# Patient Record
Sex: MALE | Race: WHITE | NOT HISPANIC OR LATINO | URBAN - METROPOLITAN AREA
[De-identification: names, ages, dates, MRNs, and addresses within clinical notes are randomized per-mention and may not be internally consistent; named-entity substitution may affect disease eponyms.]

---

## 2024-07-06 ENCOUNTER — OFFICE VISIT (OUTPATIENT)
Dept: URGENT CARE | Facility: MEDICAL CENTER | Age: 39
End: 2024-07-06
Payer: COMMERCIAL

## 2024-07-06 VITALS
OXYGEN SATURATION: 98 % | TEMPERATURE: 98 F | SYSTOLIC BLOOD PRESSURE: 120 MMHG | RESPIRATION RATE: 18 BRPM | DIASTOLIC BLOOD PRESSURE: 80 MMHG | HEART RATE: 80 BPM

## 2024-07-06 DIAGNOSIS — S90.465A INSECT BITE OF LESSER TOE OF LEFT FOOT, INITIAL ENCOUNTER: Primary | ICD-10-CM
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
## 2024-07-06 PROCEDURE — S9083 URGENT CARE CENTER GLOBAL: HCPCS | Performed by: NURSE PRACTITIONER

## 2024-07-06 PROCEDURE — G0383 LEV 4 HOSP TYPE B ED VISIT: HCPCS | Performed by: NURSE PRACTITIONER

## 2024-07-06 RX ORDER — DEXTROAMPHETAMINE SACCHARATE, AMPHETAMINE ASPARTATE, DEXTROAMPHETAMINE SULFATE AND AMPHETAMINE SULFATE 5; 5; 5; 5 MG/1; MG/1; MG/1; MG/1
TABLET ORAL
COMMUNITY
Start: 2024-05-24

## 2024-07-06 NOTE — PROGRESS NOTES
Assessment/Plan    Insect bite of lesser toe of left foot, initial encounter [S90.465A, W57.XXXA]  1. Insect bite of lesser toe of left foot, initial encounter          Apply OTC steroid cream BID , alternate with OTC benadryl cream BID   PRN PO NSAIDs for pain   Proceed to ER should symptoms worsen     Patient ID: Felipe Gilliam is a 39 y.o. male.      Reason For Visit / Chief Complaint  Chief Complaint   Patient presents with    Insect Bite     Patient states he woke up with insect bite to left foot/second toe; redness, itchy, swelling          38 y/o male presents with swelling to the left 2nd toe for 2 days. He is unsure if he got stung or bit by something but feels that this is likely insect related. He has not tried anything to help the swelling or pain. Denies fevers, chills, trauma or injury. He does express some difficulty walking due to discomfort. Currently he would rate the pain a 4/10. He also states the area has become more red and swollen over the past few days. Denies any hx of gout.     Insect Bite  This is a new problem. The current episode started in the past 7 days. The problem occurs constantly. The problem has been waxing and waning. Associated symptoms include joint swelling and a rash. Pertinent negatives include no abdominal pain, arthralgias, chest pain, chills, coughing, diaphoresis, fatigue, fever or nausea. The symptoms are aggravated by standing and walking. He has tried nothing for the symptoms. The treatment provided no relief.       History reviewed. No pertinent past medical history.    History reviewed. No pertinent surgical history.    History reviewed. No pertinent family history.    Review of Systems   Constitutional:  Negative for chills, diaphoresis, fatigue and fever.   HENT: Negative.     Eyes: Negative.    Respiratory: Negative.  Negative for cough.    Cardiovascular: Negative.  Negative for chest pain.   Gastrointestinal: Negative.  Negative for abdominal pain and  nausea.   Endocrine: Negative.    Genitourinary: Negative.    Musculoskeletal:  Positive for joint swelling. Negative for arthralgias.   Skin:  Positive for rash.   Allergic/Immunologic: Negative.    Neurological: Negative.    Hematological: Negative.    Psychiatric/Behavioral: Negative.         Objective:    /80   Pulse 80   Temp 98 °F (36.7 °C) (Temporal)   Resp 18   SpO2 98%     Physical Exam  Constitutional:       Appearance: Normal appearance. He is not toxic-appearing.   HENT:      Head: Normocephalic and atraumatic.      Right Ear: External ear normal.      Left Ear: External ear normal.      Nose: Nose normal.      Mouth/Throat:      Mouth: Mucous membranes are moist.      Pharynx: Oropharynx is clear.   Eyes:      Conjunctiva/sclera: Conjunctivae normal.   Cardiovascular:      Rate and Rhythm: Normal rate and regular rhythm.      Pulses: Normal pulses.      Heart sounds: Normal heart sounds.   Pulmonary:      Effort: Pulmonary effort is normal.      Breath sounds: Normal breath sounds.   Abdominal:      General: Abdomen is flat.      Palpations: Abdomen is soft.   Musculoskeletal:         General: Normal range of motion.      Cervical back: Normal range of motion.      Left foot: Swelling present.        Legs:       Comments: + swelling and redness to left 2nd toe extending slightly over the top of the foot   Skin:     General: Skin is warm and dry.      Capillary Refill: Capillary refill takes less than 2 seconds.   Neurological:      General: No focal deficit present.      Mental Status: He is alert and oriented to person, place, and time.   Psychiatric:         Behavior: Behavior normal.         Thought Content: Thought content normal.

## 2024-07-06 NOTE — PATIENT INSTRUCTIONS
Apply OTC steroid cream BID , alternate with OTC benadryl cream BID   PRN PO NSAIDs for pain   Proceed to ER should symptoms worsen